# Patient Record
Sex: FEMALE | Race: WHITE | NOT HISPANIC OR LATINO | URBAN - NONMETROPOLITAN AREA
[De-identification: names, ages, dates, MRNs, and addresses within clinical notes are randomized per-mention and may not be internally consistent; named-entity substitution may affect disease eponyms.]

---

## 2017-01-04 NOTE — PATIENT DISCUSSION
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 Pt reports noticed after accident last year. Fundus photo taken today. - Plan : Monitor for changes. Pt to call if notices changes. RTC in 6 months for Exam, sooner if problems or changes.

## 2017-01-04 NOTE — PATIENT DISCUSSION
(Y32.162) Keratoconjunct sicca, not specified as Sjogren's, bilateral - Assesment : Examination revealed Dry Eye Syndrome. Pt states that has some itching that comes and goes. - Plan : Monitor for changes. Recommended Pt to use ATs daily and prn. AT sample given. Pt to call if symptoms worsen, do not improve, or new symptoms or vision changes occur. RTC in 6 months for Exam, sooner if problems or changes occur.

## 2019-10-16 ENCOUNTER — IMPORTED ENCOUNTER (OUTPATIENT)
Dept: URBAN - NONMETROPOLITAN AREA CLINIC 1 | Facility: CLINIC | Age: 54
End: 2019-10-16

## 2019-10-16 PROBLEM — G45.9: Noted: 2019-10-16

## 2019-10-16 PROCEDURE — 99204 OFFICE O/P NEW MOD 45 MIN: CPT

## 2019-10-16 NOTE — PATIENT DISCUSSION
Recent Stroke with Photophbia OS- Reason for photophobia not found no iritis no vitritis. - No treatment indicated froman ocular standpoint.- Recommended to patient that if symptoms persist recommend contacting our office.

## 2022-04-10 ASSESSMENT — VISUAL ACUITY
OD_CC: 20/30-2
OS_CC: 20/25+2

## 2022-04-10 ASSESSMENT — TONOMETRY
OD_IOP_MMHG: 12
OS_IOP_MMHG: 12